# Patient Record
(demographics unavailable — no encounter records)

---

## 2024-10-11 NOTE — REASON FOR VISIT
[FreeTextEntry1] :   Omari ROSASMacoCarissa returns for follow-up regarding hypertension and hyperlipidemia.

## 2024-10-11 NOTE — HISTORY OF PRESENT ILLNESS
[FreeTextEntry1] : Since I last saw him last, he continues his usual activities and reports no symptoms.  There have been no episodes of exertional chest discomfort or MCKEON. He describes no palpitations or episodes of lightheadedness.  He reports  no orthopnea or PND.  His regimen of medications is unchanged.  There have been no new interval medical problems, except when he recently tried to donate blood, he was told his Hgb was 12.9 and he was not permitted to give blood.

## 2024-10-11 NOTE — ASSESSMENT
[FreeTextEntry1] : ============ HTN  HLD  Aortic root at sinuses of Valsalva mildly dilated; ascending aorta dilated on TTE 10/9/23  Ureteral stone s/p removal

## 2024-10-11 NOTE — DISCUSSION/SUMMARY
[EKG obtained to assist in diagnosis and management of assessed problem(s)] : EKG obtained to assist in diagnosis and management of assessed problem(s) [FreeTextEntry1] : EKG today shows:   Sinus Bradycardia at 51 bpm.  Normal intervals and axis.  Unremarkable tracing; no significant change.  PLAN: 1.  HTN - BP remains well controlled.  - continue carvedilol and ramipril. - low salt diet.  2. HLD  - continue atorvastatin.  - low fat diet.   3.  Mild enlargement of aortic root and Ascending aorta seen on echo 10/2023.  In retrospect, thinks that his mother and grandfather were told of cardiac enlargement as well; not clear what part of the heart was affected.   Repeat TTE at next O.V.  4.  ?Anemia  Will send out blood work today.  Will include anemia studies given his desire to continue to donate blood.  Flu vaccine administered.  34 minutes spent on today's office visit.   He will return for a visit in 6 months.

## 2024-10-11 NOTE — PHYSICAL EXAM
[General Appearance - Well Developed] : well developed [Normal Appearance] : normal appearance [Well Groomed] : well groomed [General Appearance - Well Nourished] : well nourished [General Appearance - In No Acute Distress] : no acute distress [Normal Conjunctiva] : the conjunctiva exhibited no abnormalities [Eyelids - No Xanthelasma] : the eyelids demonstrated no xanthelasmas [Normal Jugular Venous V Waves Present] : normal jugular venous V waves present [No Jugular Venous Rudolph A Waves] : no jugular venous rudolph A waves [Respiration, Rhythm And Depth] : normal respiratory rhythm and effort [Auscultation Breath Sounds / Voice Sounds] : lungs were clear to auscultation bilaterally [Heart Rate And Rhythm] : heart rate and rhythm were normal [Bowel Sounds] : normal bowel sounds [Abnormal Walk] : normal gait [Nail Clubbing] : no clubbing of the fingernails [Cyanosis, Localized] : no localized cyanosis [Skin Color & Pigmentation] : normal skin color and pigmentation [] : no rash [Oriented To Time, Place, And Person] : oriented to person, place, and time [Impaired Insight] : insight and judgment were intact [Affect] : the affect was normal [Mood] : the mood was normal [FreeTextEntry1] : 2+ pulses in the upper and lower extremities to the level of the feet. No edema.

## 2024-10-15 NOTE — HISTORY OF PRESENT ILLNESS
[Never] : never [TextBox_4] : Mr. Omari Wright returned to clinic today in follow-up of pulmonary nodules. In review, Mr. Wright is a 67 yo M h/o HTN, HLD, and prostate cancer who has been followed by Dr. Vincent Yo (Urology) and Ishaan Mckenna (Cardiology). Mr. Wright's course began with abdominal pain prompting an ER evaluation on 22 at [facility]. Abdominal CT revealed a kidney stone, and a ureteral stent was placed (and subsequently removed).   2023  We suspected incidental pulmonary nodules on his abdominal imaging, and we obtained a chest CT that was notable for small pulmonary nodules, a borderline dilated thoracic aorta, and patulous esophagus. I last saw Mr. Wright in 2023 when we discussed surveillance chest CT in 2023, Cardiology evaluation for the thoracic aorta, and lifestyle changes and H2 blockade for his probable GERD.  2023: Mr. Wright reports feeling well. He has not had respiratory exacerbations requiring antibiotics/steroids, ER evaluation, nor hospitalizations. He has remained active via regular running and biking. He has started taking H2 blockade for GERD, which helps his symptoms. He saw Dr. Mckenna in follow-up who planned surveillance imaging for his thoracic aortic dilation.  10/2024: Mr. Wright has been feeling well. He has not had respiratory symptoms nor exacerbations. He remains active.  PMH: HTN HLD Remote pneumonia Prostate cancer  PSH: Hernia repair x2 Prostatectomy  All:  Poison ivy  Prednisone caused nausea and vomiting  FH: Mom  at 93 with CHF Dad  of a DVT Aunt  of CHF  SH: Mr. Wright lives in [place]; he was born in [place]. He is a retired .  Mr. Wright is a never-smoker, though he reports significant second-hand smoke exposure related to his hobby over the last 30 years, listening to live music. He drinks 1-2 alcoholic drinks/day (wine). He used marijuana remotely. He denies other illicit drug use or vaping.  Mr. Wright denies exposure to Asbestos, Tuberculosis, mold, dust, smoke, fumes, heavy metals, Beryllium, birds, feathers, or hot tubs.

## 2024-10-15 NOTE — CONSULT LETTER
[Dear  ___] : Dear  [unfilled], [Consult Letter:] : I had the pleasure of evaluating your patient, [unfilled]. [( Thank you for referring [unfilled] for consultation for _____ )] : Thank you for referring [unfilled] for consultation for [unfilled] [Please see my note below.] : Please see my note below. [Consult Closing:] : Thank you very much for allowing me to participate in the care of this patient.  If you have any questions, please do not hesitate to contact me. [Sincerely,] : Sincerely, [FreeTextEntry2] : Dr. Vincent Yo [FreeTextEntry1] : Dr. Ana TORRES saw Mr. Wright today for pulmonary nodules. He brought images for a KUB and renal US, though neither identify a pulmonary nodule. I see prior imaging (e.g., abdominal CT in 1/2023), though I do not currently have access to those studies.\par  \par  Given the uncertainty, I will obtain a chest CT now to evaluate for pulmonary nodules. I will concurrently request records from your office.\par  \par  Please call me with additional thoughts.\par  \par  Marc Black\par  C: [phone] [FreeTextEntry3] : Marc Black [DrKoffi  ___] : Dr. DE LA TORRE

## 2024-10-15 NOTE — ASSESSMENT
[FreeTextEntry1] : Labs: 4/2023 WBC 3.92, Hgb 11.9, Plts 215 Na 142, K 4.4, Cl 105, HCO3 28, BUN/creat 13/ 0.82, Ca 9.9  [facility] Radiology Abd X-ray (12/2022): Impression: No acute abnormality radiographically. Contrast material within the left colon from the prior study.  Renal US with doppler (3/2023): Impression: 1. Stone in the right ureterovesical junction is unchanged when compared to the prior CT. The right ureteral jet is visualized. 2. Non-obstructing left renal calculi are stable when compared to the prior study. 3. No hydronephrosis of either kidney  Chest CT (6/13/23; [facility]): Impression: Multiple indeterminate pulmonary nodules measuring up to 6 mm in size, some of which are unchanged since the January 9, 2023 abdominopelvic CT examination (and others of which were not included in the field-of-view on that examination); please that a follow-up low-dose chest CT examination without intravenous contrast is recommended in 1 year. Mild ascending thoracic aortic dilatation measuring up to 4.3 cm in caliber. [other references include patulous esophagus, subsegmental atelectasis, and bronchiolar/bronchial thickening].  Chest CT (11/2023; [facility]): Ascending thoracic aorta is 4.5 cm (unchanged) Multiple lung nodules: index 8 mm in the RML. 3 mm in the RLL. 2 mm in the RLL.  Impression Lung nodules unchanged. Follow-up non-contrast chest CT recommended in one year. Stable dilated ascending thoracic aorta.  PFTs              8/2023 FEV1/FVC      72% FEV1               3.31, 98% FVC                 4.61, 101% TLC                 7.16, 103% RV                   2.58, 108% DLCO              23.1, 86%  A/P: 69 yo M h/o HTN, prostate cancer, nephrolithiasis, thoracic aortic aneurysm dilation, and second-hand smoke exposure is evaluated for incidental pulmonary nodules.  Mr. Wright's chest CT is due now. If no change, I would anticipate follow-up for at least 2 years give his environmental tobacco smokie exposure.  1. Incidental pulmonary nodules, index nodule 6 mm, solid 2. h/o Nephrolithiasis 3. GERD with patulous esophagus 4. Dilated thoracic aorta (4.5 cm)  -appreciate Cardiology follow-up -repeat chest CT now; -Famotidine 20 mg PO daily and lifestyle measures for GERD -Vaccinations: Influenza 9/7/23, RSV 9/7/23, Prevnar (? PCV-20) 9/7/23 -follow-up with me in 2-3 months (TTM)

## 2024-12-06 NOTE — REASON FOR VISIT
[FreeTextEntry1] :    Omari ROSASMacoCarissa returns for follow-up regarding hypertension, HLD and recent dx. of anemia.

## 2024-12-06 NOTE — DISCUSSION/SUMMARY
[EKG obtained to assist in diagnosis and management of assessed problem(s)] : EKG obtained to assist in diagnosis and management of assessed problem(s) [FreeTextEntry1] : EKG today shows:  Sinus Bradycardia at 53 bpm.  Mild first degree AV block, otherwise unremarkable.  No significant change.  PLAN: 1.  HTN - BP higher after d/c of Coreg.  - increase ramipril to 5 mg. bid. -  Continue low salt diet. -   recheck basic chem panel in 6 weeks.  2. HLD  - continue atorvastatin.  - low fat diet.   3.  Mild enlargement of aortic root and Ascending aorta seen on echo 10/2023.  Will arrange for cardiac CTA after next O.V. to re-assess aorta and to screen for CAD.     4.  Anemia, resolved, apparently was a side effect of Coreg.  30 minutes spent on today's office visit.   He will return for a visit in April and for a blood draw in January.

## 2024-12-06 NOTE — HISTORY OF PRESENT ILLNESS
[FreeTextEntry1] : When he recently tried to donate blood, he was told his Hgb was 12.9 and he was not permitted to give blood.  Coreg was stopped at his last visit as anemia is a reported side effect.  As he returns, he remains active and well.  He continues his customary activities without limitation.  He describes no episodes of exertional chest discomfort or dyspnea.  He reports no palpitations.  There have been no episodes of orthopnea or PND.  Blood counts were rechecked on December 2, and hemoglobin had normalized at 14.1.

## 2024-12-09 NOTE — PHYSICAL EXAM
[No Acute Distress] : no acute distress [TextBox_2] : Unable to obtain due to phone visit; speaking and breathing comfortably over the phone.

## 2024-12-09 NOTE — CONSULT LETTER
[Dear  ___] : Dear  [unfilled], [Consult Letter:] : I had the pleasure of evaluating your patient, [unfilled]. [( Thank you for referring [unfilled] for consultation for _____ )] : Thank you for referring [unfilled] for consultation for [unfilled] [Please see my note below.] : Please see my note below. [Consult Closing:] : Thank you very much for allowing me to participate in the care of this patient.  If you have any questions, please do not hesitate to contact me. [Sincerely,] : Sincerely, [FreeTextEntry2] : Dr. Vincent Yo [FreeTextEntry1] : Dr. Ana TORRES saw Mr. Wright today for pulmonary nodules. He brought images for a KUB and renal US, though neither identify a pulmonary nodule. I see prior imaging (e.g., abdominal CT in 1/2023), though I do not currently have access to those studies.\par  \par  Given the uncertainty, I will obtain a chest CT now to evaluate for pulmonary nodules. I will concurrently request records from your office.\par  \par  Please call me with additional thoughts.\par  \par  Marc Black\par  C: [phone] [FreeTextEntry3] : Marc Black [DrKoffi  ___] : Dr. DE LA TORRE

## 2024-12-09 NOTE — HISTORY OF PRESENT ILLNESS
[Never] : never [TextBox_4] : I spoke to Mr. Omari Wright in follow-up today for pulmonary nodules. In review, Mr. Wright is a 67 yo M h/o HTN, HLD, and prostate cancer who has been followed by Dr. Vincent Yo (Urology) and Ishaan Mckenna (Cardiology). Mr. Wright's course began with abdominal pain prompting an ER evaluation on 22 at [facility]. Abdominal CT revealed a kidney stone, and a ureteral stent was placed (and subsequently removed).   2023  We suspected incidental pulmonary nodules on his abdominal imaging, and we obtained a chest CT that was notable for small pulmonary nodules, a borderline dilated thoracic aorta, and patulous esophagus. I last saw Mr. Wright in 2023 when we discussed surveillance chest CT in 2023, Cardiology evaluation for the thoracic aorta, and lifestyle changes and H2 blockade for his probable GERD.  2023: Mr. Wright reports feeling well. He has not had respiratory exacerbations requiring antibiotics/steroids, ER evaluation, nor hospitalizations. He has remained active via regular running and biking. He has started taking H2 blockade for GERD, which helps his symptoms. He saw Dr. Mckenna in follow-up who planned surveillance imaging for his thoracic aortic dilation.  10/2024: Mr. Wright has been feeling well. He has not had respiratory symptoms nor exacerbations. He remains active.  2024: Mr. Wright imported his recent chest CT from [facility]. He feels well.  PMH: HTN HLD Remote pneumonia Prostate cancer  PSH: Hernia repair x2 Prostatectomy  All:  Poison ivy  Prednisone caused nausea and vomiting  FH: Mom  at 93 with CHF Dad  of a DVT Aunt  of CHF  SH: Mr. Wright lives in [place]; he was born in [place]. He is a retired .  Mr. Wright is a never-smoker, though he reports significant second-hand smoke exposure related to his hobby over the last 30 years, listening to live music. He drinks 1-2 alcoholic drinks/day (wine). He used marijuana remotely. He denies other illicit drug use or vaping.  Mr. rWight denies exposure to Asbestos, Tuberculosis, mold, dust, smoke, fumes, heavy metals, Beryllium, birds, feathers, or hot tubs.

## 2024-12-09 NOTE — ASSESSMENT
[FreeTextEntry1] : Labs: 4/2023 WBC 3.92, Hgb 11.9, Plts 215 Na 142, K 4.4, Cl 105, HCO3 28, BUN/creat 13/ 0.82, Ca 9.9  [facility] Radiology Abd X-ray (12/2022): Impression: No acute abnormality radiographically. Contrast material within the left colon from the prior study.  Renal US with doppler (3/2023): Impression: 1. Stone in the right ureterovesical junction is unchanged when compared to the prior CT. The right ureteral jet is visualized. 2. Non-obstructing left renal calculi are stable when compared to the prior study. 3. No hydronephrosis of either kidney  Chest CT (6/13/23; [facility]): Impression: Multiple indeterminate pulmonary nodules measuring up to 6 mm in size, some of which are unchanged since the January 9, 2023 abdominopelvic CT examination (and others of which were not included in the field-of-view on that examination); please that a follow-up low-dose chest CT examination without intravenous contrast is recommended in 1 year. Mild ascending thoracic aortic dilatation measuring up to 4.3 cm in caliber. [other references include patulous esophagus, subsegmental atelectasis, and bronchiolar/bronchial thickening].  Chest CT (11/2023; [facility]): Ascending thoracic aorta is 4.5 cm (unchanged) Multiple lung nodules: index 8 mm in the RML. 3 mm in the RLL. 2 mm in the RLL.  Impression Lung nodules unchanged. Follow-up non-contrast chest CT recommended in one year. Stable dilated ascending thoracic aorta.  Chest CT (11/8/24; [facility]): IMPRESSION: 1.	Stable pulmonary nodules measuring up to 0.7 cm in the right middle lobe. Repeat imaging in 12 months is recommended to ensure continued stability as per the 2017 Fleischner society guidelines.  2.	Stable fusiform dilation of the ascending thoracic aorta measuring 4.5 cm.   PFTs              8/2023 FEV1/FVC      72% FEV1               3.31, 98% FVC                 4.61, 101% TLC                 7.16, 103% RV                   2.58, 108% DLCO              23.1, 86%  A/P: 67 yo M h/o HTN, prostate cancer, nephrolithiasis, thoracic aortic aneurysm dilation, and second-hand smoke exposure is evaluated for incidental pulmonary nodules.  Mr. Wright's chest CTs are stable from 6/2023 to 11/2024. For a solid >6 mm nodule with risk factors for malignancy (h/o prostate cancer, environmental tobacco smoke), I recommended 2 years of follow-up. We will repeat his chest CT in 11/2025.  1. Incidental pulmonary nodules, index nodule 8 mm, RML, solid 2. h/o Nephrolithiasis 3. GERD with patulous esophagus 4. Dilated thoracic aorta (4.5 cm)  -appreciate Cardiology follow-up -repeat chest CT in 11/2025 -Famotidine 20 mg PO daily and lifestyle measures for GERD -Vaccinations: Influenza 9/7/23, RSV 9/7/23, Prevnar (? PCV-20) 9/7/23 -follow-up with me in 11/2025

## 2025-04-23 NOTE — DISCUSSION/SUMMARY
[EKG obtained to assist in diagnosis and management of assessed problem(s)] : EKG obtained to assist in diagnosis and management of assessed problem(s) [FreeTextEntry1] : EKG today shows:   Sinus Bradycardia at 56 bpm.  Normal intervals and axis.  Unremarkable tracing; no change.  PLAN: 1.  HTN - BP in normal range today. -  continue ramipril to 5 mg. bid. -  Continue low salt diet.  2. HLD  - continue atorvastatin.  - low fat diet.   3.  Mild enlargement of aortic root and Ascending aorta seen on echo 10/2023.  Will consider a cardiac CTA after his next O.V.   31 minutes spent on today's office visit.   He will return for a visit in 6 mos.

## 2025-04-23 NOTE — HISTORY OF PRESENT ILLNESS
[FreeTextEntry1] : When he recently tried to donate blood, he was told his Hgb was 12.9 and he was not permitted to give blood.  Coreg was stopped at his last visit as anemia is a reported side effect.  12/6/24: As he returns, he remains active and well.  He continues his customary activities without limitation.  He describes no episodes of exertional chest discomfort or dyspnea.  He reports no palpitations.  There have been no episodes of orthopnea or PND. Blood counts were rechecked on December 2, and hemoglobin had normalized at 14.1.  4/23/25: Omari remains active and well from a cardiac standpoint.  He reports no exertional chest discomfort or MCKEON.  There have been no palpitations or episodes of lightheadedness/LOC. There have been no episodes of orthopnea or PND. He has an inguinal hernia that he plans to have repair.  He suffered a cat bite near the base of his R thumb that required an operative procedure recently, done at [facility]; he is very happy with the outcome.

## 2025-04-23 NOTE — PHYSICAL EXAM
[Normal Appearance] : normal appearance [General Appearance - Well Developed] : well developed [Well Groomed] : well groomed [General Appearance - Well Nourished] : well nourished [General Appearance - In No Acute Distress] : no acute distress [Normal Conjunctiva] : the conjunctiva exhibited no abnormalities [Eyelids - No Xanthelasma] : the eyelids demonstrated no xanthelasmas [Normal Jugular Venous V Waves Present] : normal jugular venous V waves present [No Jugular Venous Rudolph A Waves] : no jugular venous rudolph A waves [Respiration, Rhythm And Depth] : normal respiratory rhythm and effort [Auscultation Breath Sounds / Voice Sounds] : lungs were clear to auscultation bilaterally [Heart Rate And Rhythm] : heart rate and rhythm were normal [Bowel Sounds] : normal bowel sounds [Abnormal Walk] : normal gait [Nail Clubbing] : no clubbing of the fingernails [Cyanosis, Localized] : no localized cyanosis [Skin Color & Pigmentation] : normal skin color and pigmentation [] : no rash [Oriented To Time, Place, And Person] : oriented to person, place, and time [Impaired Insight] : insight and judgment were intact [Affect] : the affect was normal [Mood] : the mood was normal [FreeTextEntry1] : 2+ pulses in the upper and lower extremities to the level of the feet. No edema.